# Patient Record
Sex: FEMALE | URBAN - NONMETROPOLITAN AREA
[De-identification: names, ages, dates, MRNs, and addresses within clinical notes are randomized per-mention and may not be internally consistent; named-entity substitution may affect disease eponyms.]

---

## 2023-12-12 ENCOUNTER — APPOINTMENT (OUTPATIENT)
Dept: URBAN - NONMETROPOLITAN AREA CLINIC 47 | Age: 38
Setting detail: DERMATOLOGY
End: 2023-12-13

## 2023-12-12 DIAGNOSIS — L30.9 DERMATITIS, UNSPECIFIED: ICD-10-CM

## 2023-12-12 PROCEDURE — 11104 PUNCH BX SKIN SINGLE LESION: CPT

## 2023-12-12 PROCEDURE — 11105 PUNCH BX SKIN EA SEP/ADDL: CPT

## 2023-12-12 PROCEDURE — OTHER BIOPSY BY PUNCH METHOD: OTHER

## 2023-12-12 ASSESSMENT — LOCATION SIMPLE DESCRIPTION DERM
LOCATION SIMPLE: NECK
LOCATION SIMPLE: SCALP

## 2023-12-12 ASSESSMENT — LOCATION DETAILED DESCRIPTION DERM
LOCATION DETAILED: LEFT CENTRAL POSTERIOR NECK
LOCATION DETAILED: LEFT CENTRAL PARIETAL SCALP

## 2023-12-12 ASSESSMENT — LOCATION ZONE DERM
LOCATION ZONE: SCALP
LOCATION ZONE: NECK

## 2023-12-12 NOTE — PROCEDURE: BIOPSY BY PUNCH METHOD
Detail Level: Detailed
Was A Bandage Applied: Yes
Punch Size In Mm: 4
Size Of Lesion In Cm (Optional): 0
Depth Of Punch Biopsy: dermis
Biopsy Type: H and E
Anesthesia Type: 1% lidocaine with epinephrine
Anesthesia Volume In Cc: 0.5
Hemostasis: None
Epidermal Sutures: none, closed by secondary intention
Wound Care: Petrolatum
Dressing: bandage
Patient Will Remove Sutures At Home?: No
Lab: [id number]
Consent: Written consent was obtained and risks were reviewed including but not limited to scarring, infection, bleeding, scabbing, incomplete removal, nerve damage and allergy to anesthesia.
Post-Care Instructions: I reviewed with the patient in detail post-care instructions. Patient is to keep the biopsy site dry overnight, and then apply bacitracin twice daily until healed. Patient may apply hydrogen peroxide soaks to remove any crusting.
Home Suture Removal Text: Patient was provided a home suture removal kit and will remove their sutures at home.  If they have any questions or difficulties they will call the office.
Notification Instructions: Patient will be notified of biopsy results. However, patient instructed to call the office if not contacted within 2 weeks.
Billing Type: Third-Party Bill
Information: Selecting Yes will display possible errors in your note based on the variables you have selected. This validation is only offered as a suggestion for you. PLEASE NOTE THAT THE VALIDATION TEXT WILL BE REMOVED WHEN YOU FINALIZE YOUR NOTE. IF YOU WANT TO FAX A PRELIMINARY NOTE YOU WILL NEED TO TOGGLE THIS TO 'NO' IF YOU DO NOT WANT IT IN YOUR FAXED NOTE.
Epidermal Sutures: 4-0 Ethilon
Suture Removal: 14 days

## 2023-12-20 ENCOUNTER — RX ONLY (RX ONLY)
Age: 38
End: 2023-12-20

## 2023-12-20 RX ORDER — CLOBETASOL PROPIONATE 0.5 MG/ML
SOLUTION TOPICAL
Qty: 50 | Refills: 5 | Status: ERX | COMMUNITY
Start: 2023-12-20

## 2023-12-20 RX ORDER — TRIAMCINOLONE ACETONIDE 1 MG/G
CREAM TOPICAL
Qty: 454 | Refills: 5 | Status: ERX | COMMUNITY
Start: 2023-12-20

## 2024-05-22 ENCOUNTER — APPOINTMENT (OUTPATIENT)
Dept: URBAN - NONMETROPOLITAN AREA CLINIC 47 | Age: 39
Setting detail: DERMATOLOGY
End: 2024-05-23

## 2024-05-22 DIAGNOSIS — L20.89 OTHER ATOPIC DERMATITIS: ICD-10-CM

## 2024-05-22 PROCEDURE — OTHER PRESCRIPTION: OTHER

## 2024-05-22 PROCEDURE — 99214 OFFICE O/P EST MOD 30 MIN: CPT

## 2024-05-22 PROCEDURE — OTHER ADDITIONAL NOTES: OTHER

## 2024-05-22 PROCEDURE — OTHER COUNSELING: OTHER

## 2024-05-22 PROCEDURE — OTHER PRESCRIPTION MEDICATION MANAGEMENT: OTHER

## 2024-05-22 RX ORDER — MONTELUKAST SODIUM 10 MG/1
TABLET, FILM COATED ORAL
Qty: 30 | Refills: 3 | Status: ERX | COMMUNITY
Start: 2024-05-22

## 2024-05-22 ASSESSMENT — LOCATION ZONE DERM
LOCATION ZONE: SCALP
LOCATION ZONE: FACE
LOCATION ZONE: NECK
LOCATION ZONE: HAND

## 2024-05-22 ASSESSMENT — LOCATION SIMPLE DESCRIPTION DERM
LOCATION SIMPLE: LEFT HAND
LOCATION SIMPLE: POSTERIOR NECK
LOCATION SIMPLE: HAIR
LOCATION SIMPLE: RIGHT FOREHEAD
LOCATION SIMPLE: RIGHT HAND

## 2024-05-22 ASSESSMENT — LOCATION DETAILED DESCRIPTION DERM
LOCATION DETAILED: HAIR
LOCATION DETAILED: RIGHT ULNAR DORSAL HAND
LOCATION DETAILED: RIGHT INFERIOR MEDIAL FOREHEAD
LOCATION DETAILED: LEFT POSTERIOR NECK
LOCATION DETAILED: LEFT ULNAR DORSAL HAND

## 2024-05-22 ASSESSMENT — BSA RASH: BSA RASH: 6

## 2024-05-22 ASSESSMENT — ITCH NUMERIC RATING SCALE: HOW SEVERE IS YOUR ITCHING?: 8

## 2024-05-22 ASSESSMENT — SEVERITY ASSESSMENT: SEVERITY: MODERATE

## 2024-05-22 NOTE — PROCEDURE: ADDITIONAL NOTES
Render Risk Assessment In Note?: no
Detail Level: Simple
Additional Notes: Will refer for consultation with Bibi for patch testing.

## 2024-05-22 NOTE — PROCEDURE: PRESCRIPTION MEDICATION MANAGEMENT
Continue regimen as directed
Initiate Treatment: Will add Singular 10mg qhs.
Render In Strict Bullet Format?: No
Detail Level: Zone

## 2024-09-20 ENCOUNTER — APPOINTMENT (OUTPATIENT)
Dept: URBAN - NONMETROPOLITAN AREA CLINIC 47 | Age: 39
Setting detail: DERMATOLOGY
End: 2024-09-23

## 2024-09-20 DIAGNOSIS — L20.89 OTHER ATOPIC DERMATITIS: ICD-10-CM

## 2024-09-20 PROCEDURE — OTHER ORDER FOR PATCH TESTING: OTHER

## 2024-09-20 PROCEDURE — OTHER PRESCRIPTION MEDICATION MANAGEMENT: OTHER

## 2024-09-20 PROCEDURE — 99214 OFFICE O/P EST MOD 30 MIN: CPT

## 2024-09-20 PROCEDURE — OTHER COUNSELING: OTHER

## 2024-09-20 ASSESSMENT — LOCATION ZONE DERM
LOCATION ZONE: NECK
LOCATION ZONE: FACE
LOCATION ZONE: HAND
LOCATION ZONE: SCALP

## 2024-09-20 ASSESSMENT — LOCATION DETAILED DESCRIPTION DERM
LOCATION DETAILED: LEFT POSTERIOR NECK
LOCATION DETAILED: RIGHT ULNAR DORSAL HAND
LOCATION DETAILED: LEFT ULNAR DORSAL HAND
LOCATION DETAILED: RIGHT INFERIOR MEDIAL FOREHEAD
LOCATION DETAILED: HAIR

## 2024-09-20 ASSESSMENT — LOCATION SIMPLE DESCRIPTION DERM
LOCATION SIMPLE: RIGHT HAND
LOCATION SIMPLE: POSTERIOR NECK
LOCATION SIMPLE: HAIR
LOCATION SIMPLE: LEFT HAND
LOCATION SIMPLE: RIGHT FOREHEAD

## 2024-09-20 NOTE — PROCEDURE: ORDER FOR PATCH TESTING
Location Patches Should Be Applied: Back
Patch Test Reading Schedule: First Reading at 48 hours and Second Reading at 96 hours
Patch Test To Be Applied: Core ACDS Recommended Series
Detail Level: Simple
Counseling: I discussed the timing of the procedure and ensured the patient understands that this test requires multiple visits. No topical steroids applied should be applied to the patch testing location and no oral prednisone for two week prior to the test. While the patches are in place they should be kept dry which will limit bathing, swimming an exercise. I also explained that it is common for testing to be negative and this doesn't mean there isn't a allergic reaction occurring. During the testing itching is common.

## 2024-09-20 NOTE — PROCEDURE: PRESCRIPTION MEDICATION MANAGEMENT
Detail Level: Zone
Continue Regimen: clobetasol 0.05% cream twice daily as needed and clobetasol 0.05% scalp solution twice daily as needed, pending patch testing results
Render In Strict Bullet Format?: No

## 2024-10-21 ENCOUNTER — APPOINTMENT (OUTPATIENT)
Dept: URBAN - METROPOLITAN AREA CLINIC 216 | Age: 39
Setting detail: DERMATOLOGY
End: 2024-10-22

## 2024-10-21 DIAGNOSIS — L259 CONTACT DERMATITIS AND OTHER ECZEMA, UNSPECIFIED CAUSE: ICD-10-CM

## 2024-10-21 PROBLEM — L23.9 ALLERGIC CONTACT DERMATITIS, UNSPECIFIED CAUSE: Status: ACTIVE | Noted: 2024-10-21

## 2024-10-21 PROCEDURE — OTHER PATCH TESTING: OTHER

## 2024-10-21 PROCEDURE — 95044 PATCH/APPLICATION TESTS: CPT

## 2024-10-21 NOTE — PROCEDURE: PATCH TESTING
Detail Level: None
Number Of Patches (Maximum Allowable Per Dos By Cms Is 90): 36
Consent: Written consent obtained, risks reviewed including but not limited to rash, itching, allergic reaction, post-inflammatory pigmentary changes, systemic rash, remote possibility of anaphylaxis to allergen.
Post-Care Instructions: I reviewed with the patient in detail post-care instructions. Patient should not sweat, pick at, or get the patches wet for 48 hours.

## 2024-10-23 ENCOUNTER — APPOINTMENT (OUTPATIENT)
Dept: URBAN - METROPOLITAN AREA CLINIC 216 | Age: 39
Setting detail: DERMATOLOGY
End: 2024-10-24

## 2024-10-23 DIAGNOSIS — L259 CONTACT DERMATITIS AND OTHER ECZEMA, UNSPECIFIED CAUSE: ICD-10-CM

## 2024-10-23 PROBLEM — L23.9 ALLERGIC CONTACT DERMATITIS, UNSPECIFIED CAUSE: Status: ACTIVE | Noted: 2024-10-23

## 2024-10-23 PROCEDURE — OTHER PATCH TEST REMOVAL: OTHER

## 2024-10-23 PROCEDURE — OTHER CORE ACDS PATCH TEST READING: OTHER

## 2024-10-23 PROCEDURE — 99024 POSTOP FOLLOW-UP VISIT: CPT

## 2024-10-23 NOTE — PROCEDURE: CORE ACDS PATCH TEST READING
Allergen 9 Reaction: no reaction
Name Of Allergen 43: 2-hydroxyethyl methacrylate 2% pet
Name Of Allergen 58: Cocamide YUSUF 0.5%
Name Of Allergen 57: Sesquiterpene lactone mix 0.1% pet
Name Of Allergen 46: Methyl methacyrlate 2% pet
Name Of Allergen 36: Lidocaine 15% pet
Name Of Allergen 83: Benzyl salicylate 1% pet
Name Of Allergen 38: Isopropynyl butylcarbamate 0.1% pet
Name Of Allergen 33: Bacitracin 20% pet
Name Of Allergen 53: Propolis 10% pet
Show Allergen Counseling In The Note?: No
Name Of Allergen 74: Ethyl cyanoacrylate 10% pet
Name Of Allergen 76: Disperse orange 3  1% pet
What Reading Time Point?: 48 hour
Name Of Allergen 81: Cetyl Steryl alcohol 20% pet
Name Of Allergen 64: Cassandra hannah 2% pet (Cananga odorata)
Name Of Allergen 7: Colophony 20% pet
Name Of Allergen 80: Benzyl alcohol 10% pet
Name Of Allergen 2: LAnolin alcohol (Amerchol 101) 50% pet
Name Of Allergen 27: Tixocortol-21-pivalate 1% pet
Name Of Allergen 48: Cinnamic aldehyde 1% pet
Name Of Allergen 13: n-arvp-kfhwupykwke formaldehyde resin 1% pet
Number Of Patches Read: 36
Name Of Allergen 25: Diazolidinyl urea 1% pet
Name Of Allergen 3: Neomycin 20% pet
Name Of Allergen 82: [place] 2.5% pet
Name Of Allergen 47: Lavender absolute 2% pet
Name Of Allergen 14: Bisphenol A epoxy resin 1% pet
Name Of Allergen 31: Hydrocortisone-17-butyrate 1% pet
Name Of Allergen 60: Benzalkonium chloride 0.1% pet
Name Of Allergen 45: Decyl glucoside 5% pet
Name Of Allergen 77: Benzoic acid 5% pet
Name Of Allergen 87: Parmoxine hydrochloride 2% pet
Name Of Allergen 71: Triamcinolone 1% pet
Name Of Allergen 51: Tea tree oil 5% pet
Name Of Allergen 63: Sorbic acid 2% pet
Name Of Allergen 59: Hydroxyperoxides of limonene 2% pet
Name Of Allergen 79: 2-ethylhexyl-4-methoxycinnamate 10% pet (octinoxate)
Name Of Allergen 21: Formaldehhyde 1% aq
Name Of Allergen 70: Ethyl hexyl glycerol 5%
Name Of Allergen 37: Popylene glycol 30%
Name Of Allergen 16: Black rubber mix 0.6% pet
Name Of Allergen 44: Oleamidopropyl dimethylamine 0.1% aq
Name Of Allergen 52: Chlorhexidine digluconate 0.5% aq
Name Of Allergen 89: Lauryl glycoside 3% pet
Name Of Allergen 9: Methylisothiazolinone 0.2% aq
Name Of Allergen 24: Thiuram mix 3% pet
Name Of Allergen 42: 3-(dimethylamino) propylamine (DMAPA) 1% aq
Name Of Allergen 65: Compositae mix 6% pet
Name Of Allergen 29: Imidazolidinyl urea 2% pet
Name Of Allergen 19: Methyldibromoglutaronitrile 0.5%
Name Of Allergen 66: Ethylemeurea melamine -formaldehyde 5% pet
Name Of Allergen 75: Phenoxyethanol 1% pet
Name Of Allergen 84: Disperse yellow 3% pet
Name Of Allergen 23: 2-Bromo-2-nitropropane 1,3-diol 0.5% pet
Name Of Allergen 28: Gold sodium thiosulfate 2% pet
Name Of Allergen 35: Disperse Blue 106/124 mix 1% pet
Name Of Allergen 67: Sorbitan sesquioleate 20% pet
Name Of Allergen 55: 9-vxiifet4-ltkjjgkimnytnbwxqzn (Benzophenone 3) 10% pet
Name Of Allergen 4: Potassium dichromate 0.25% pet
Name Of Allergen 39: Polymyxin B sulfate 3% pet
Name Of Allergen 18: Quaternium-15 2% pet
Name Of Allergen 61: 2-hydroxy-4-methoxybenzophenone-5-sulfonic acid (benzophenone-4) 10% pet
Name Of Allergen 17: Methylchloroisothiazolinone/methylisothiazolinone 100ppm aq
Name Of Allergen 40: Cocamidopropyl betaine 1% aq
Name Of Allergen 5: DMDM Hydantoin 1.0% pet
Name Of Allergen 78: Butylhydroxytolulene (BHT) 2% pet
Name Of Allergen 68: 1,3-diphenylguanidine (DPG)
Name Of Allergen 41: Mixed dialkyl thioureas 1% pet
Name Of Allergen 49: D/L-alpha-tocopherol 100%
Name Of Allergen 54: 4-chloro-3,5-xylenon (PCMX) 1% pet
Name Of Allergen 72: Clobetasol-17-proprionate 1%
Name Of Allergen 1: Nickel sulfate 2.5% pet
Name Of Allergen 32: 2-Mercaptobenzothiazole 1% pet
Detail Level: Zone
Name Of Allergen 8: Paraben mix 12% pet
Name Of Allergen 10: Balsam of [place] (Myroxylon pereirae) 25% pet
Name Of Allergen 56: Tosylamide formaldehyde resin 10% pet
Name Of Allergen 88: Shellac 20% ethanol
Name Of Allergen 30: Budesonide 0.1% pet
Name Of Allergen 85: Gissel absolute 2% pet
Name Of Allergen 86: Mentha piperita oil 2% (peppermint oil)
Name Of Allergen 73: Amidoamine 0.1% aq
Name Of Allergen 26: Benzocaine 5% pet
Name Of Allergen 20: p-phenylenediamine 1% pet
Name Of Allergen 62: Sodium benzoate 5% pet
Name Of Allergen 6: Fragrance mix I 8.0% pet
Name Of Allergen 69: 4-chloro-3-cresol 1% pet
Name Of Allergen 34: Fragrance mix II 14% pet
Name Of Allergen 12: Cobalt chloride 1% pet
Name Of Allergen 50: Ethyl acrylate 0.1% pet
Name Of Allergen 22: Mercapto mix 1% pet
Name Of Allergen 15: Carba mix 3% pet
Name Of Allergen 11: Ethylenediamine dihydrochloride 1%